# Patient Record
Sex: FEMALE | Race: WHITE
[De-identification: names, ages, dates, MRNs, and addresses within clinical notes are randomized per-mention and may not be internally consistent; named-entity substitution may affect disease eponyms.]

---

## 2017-12-15 ENCOUNTER — HOSPITAL ENCOUNTER (OUTPATIENT)
Dept: HOSPITAL 74 - JASU-SURG | Age: 47
Discharge: HOME | End: 2017-12-15
Attending: OBSTETRICS & GYNECOLOGY
Payer: COMMERCIAL

## 2017-12-15 VITALS — BODY MASS INDEX: 18.8 KG/M2

## 2017-12-15 VITALS — SYSTOLIC BLOOD PRESSURE: 103 MMHG | DIASTOLIC BLOOD PRESSURE: 53 MMHG | HEART RATE: 60 BPM

## 2017-12-15 VITALS — TEMPERATURE: 97.7 F

## 2017-12-15 DIAGNOSIS — N92.1: Primary | ICD-10-CM

## 2017-12-15 DIAGNOSIS — N84.0: ICD-10-CM

## 2017-12-15 PROCEDURE — 0UB98ZX EXCISION OF UTERUS, VIA NATURAL OR ARTIFICIAL OPENING ENDOSCOPIC, DIAGNOSTIC: ICD-10-PCS | Performed by: OBSTETRICS & GYNECOLOGY

## 2017-12-15 NOTE — OP
Operative Note





- Note:


Operative Date: 12/15/17


Pre-Operative Diagnosis: Menometrorrhagia


Operation: Hysteroscopy, D&C, polypectomy


Findings: 





Fundal endometrial polyp ~1cm


Post-Operative Diagnosis: Other (endometrial polyp)


Surgeon: Dany Dasilva


Anesthesiologist/CRNA: Dulce Maria Mendoza


Anesthesia: General


Specimens Removed: Endometrial curettings, endometrial polyp


Estimated Blood Loss (mls): 5


Drains, Volume Out (mls): 0


Blood Volume Replaced (mls): 0


Fluid Volume Replaced (mls): 400


Operative Report Dictated: Yes
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
## 2017-12-16 NOTE — OP
DATE OF OPERATION:  12/15/2017 

 

PREOPERATIVE DIAGNOSIS:  Menometrorrhagia.

 

POSTOPERATIVE DIAGNOSIS:  Menometrorrhagia and endometrial polyp.

 

SURGERY:  Hysteroscopy, dilation and curettage, polypectomy.

 

SURGEON:  Yasmine Wellington MD 

 

ANESTHESIOLOGIST:  Dulce Maria Mendoza MD

 

ANESTHESIA:  General.  

 

INTRAVENOUS FLUIDS:  Crystalloid 400 mL.

 

ESTIMATED BLOOD LOSS:  5 mL.  

 

COMPLICATIONS:  None.

 

PATHOLOGY:  Endometrial curettings and uterine polyp.  

 

INTRAOPERATIVE FINDINGS:  Examination under anesthesia revealed a small uterus with

no pelvic or adnexal masses.  Hysteroscopy revealed a small fundal endometrial polyp

approximately 1 cm in size.  There were no other lesions noted.  The polyp was

removed without complications.  

 

PROCEDURE:  The patient was met preoperatively.  Risks, benefits, and alternatives of

surgery were discussed in detail.  All questions were answered.  The patient was then

brought to the OR with the IV running.  She was placed on the surgical table in the

supine position.  The anesthesia was achieved without difficulty.  The patient was

then placed in a dorsal lithotomy position using adjustable Miah stirrups.  She was

examined under anesthesia with the findings as described above.  The patient was then

prepped and draped in the usual sterile fashion.  A timeout procedure was conducted

as per standard protocol.  The surgeons then proceeded with the operation.  A sterile

speculum was introduced inside the vagina, with good visualization of the cervix. 

The cervix was grasped with a single-tooth tenaculum.  No dilation of the cervical

canal was necessary.  A hysteroscope was gently introduced into the uterine cavity,

with the findings as described above.  The hysteroscope was used to remove the

endometrial polyp without complications.  The hysteroscope was then removed and sharp

uterine curettage was performed.  The tissue was sent to Pathology for evaluation.  A

hysteroscope was once again introduced into the uterine cavity, and a normal uterine

cavity was noted with good hemostasis.  All instruments were then removed from the

patient.  Sponge, lap, and instrument counts were correct.  Good hemostasis was

confirmed, and the patient was transferred to recovery room awake and in stable

condition.  

 

 

YASMINE WELLINGTON M.D.

 

[id number]

DD: 12/15/2017 14:32

DT: 12/16/2017 14:42

Job #:  [id number]

## 2017-12-18 NOTE — PATH
Surgical Pathology Report



Patient Name:  JAMIL SWEET

Accession #:  [id number]

[facility]. Rec. #:  [id number]                                                        

   /Age/Gender:  1970 (Age: 47) / F

Account:  [id number]                                                          

             Location: [facility]

Taken:  12/15/2017

Received:  12/15/2017

Reported:  2017

Physicians:  Dany Dasilva M.D.

  



Specimen(s) Received

 ENDOMETRIAL CURETTINGS AND POLYPS 





Clinical History

Vaginal bleeding







Final Diagnosis

ENDOMETRIAL CURETTINGS, POLYP, DILATATION AND CURETTAGE:

POLYPOID PROLIFERATIVE ENDOMETRIUM SUGGESTIVE OF ENDOMETRIAL POLYP, LOWER

UTERINE SEGMENT, SUPERFICIAL MYOMETRIUM AND BENIGN ENDOCERVICAL TISSUE. 





***Electronically Signed***

Antonella Witt M.D.





Gross Description

Received in formalin labeled "endometrial curettings and polyp," is a 4.0 x 2.2

x 0.4 cm aggregate of tan soft tissue fragments admixed with blood clot. The

formalin is filtered and the specimen is entirely submitted in 2 cassettes.



/12/15/2017



saudi/12/15/2017

## 2019-12-20 ENCOUNTER — HOSPITAL ENCOUNTER (OUTPATIENT)
Dept: HOSPITAL 74 - JASU-SURG | Age: 49
Discharge: HOME | End: 2019-12-20
Attending: OBSTETRICS & GYNECOLOGY
Payer: COMMERCIAL

## 2019-12-20 VITALS — BODY MASS INDEX: 18.3 KG/M2

## 2019-12-20 VITALS — TEMPERATURE: 98.1 F | DIASTOLIC BLOOD PRESSURE: 68 MMHG | SYSTOLIC BLOOD PRESSURE: 111 MMHG | HEART RATE: 64 BPM

## 2019-12-20 DIAGNOSIS — N92.1: Primary | ICD-10-CM

## 2019-12-20 PROCEDURE — 0U5B8ZZ DESTRUCTION OF ENDOMETRIUM, VIA NATURAL OR ARTIFICIAL OPENING ENDOSCOPIC: ICD-10-PCS | Performed by: OBSTETRICS & GYNECOLOGY

## 2019-12-20 NOTE — OP
Operative Note





- Note:


Operative Date: 12/20/19


Pre-Operative Diagnosis: Menometrorrhagia


Operation: Hysteroscopy, thermal endometrial ablation ([facility])


Findings: 





Active vaginal bleeding prior to procedure. Normal small endometrial cavity. 


Post-Operative Diagnosis: Same as Pre-op


Surgeon: Dany Dasilva


Anesthesiologist/CRNA: Tory Zaidi


Anesthesia: General


Estimated Blood Loss (mls): 30


Blood Volume Replaced (mls): 0


Fluid Volume Replaced (mls): 750


Operative Report Dictated: Yes
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
## 2019-12-27 NOTE — OP
DATE OF OPERATION:  12/20/2019

 

PREOPERATIVE DIAGNOSIS:  Menometrorrhagia.

 

POSTOPERATIVE DIAGNOSIS:  Menometrorrhagia.

 

PROCEDURE:  Hysteroscopy, thermal endometrial ablation via [employer] HTA.

 

SURGEON:  Dany Dasilva MD 

 

ANESTHESIOLOGIST:  Tory Zaidi MD 

 

ANESTHESIA:  General.

 

COMPLICATIONS:  None.

 

ESTIMATED BLOOD LOSS:  30 mL (patient was bleeding prior to surgery.  No

intraoperative blood loss due to procedure).

 

INTRAVENOUS FLUIDS:  750 mL.

 

PATHOLOGY:  None. 

 

FINDINGS:  Examination under anesthesia revealed a small, retroverted uterus. 

Hysteroscopy revealed a small uterine cavity with irregular endometrium due to

menstrual cycle and no lesions or masses noted.  Good hemostasis was noted at the end

of the procedure with no bleeding at the end of the procedure.  

 

PROCEDURE DESCRIPTION:  The patient was met preoperatively.  Risks, benefits,

alternatives of surgery were discussed in details.  All questions were answered.  The

consent form was reviewed and discussed.  The patient verbalized her understanding

and requested to proceed with surgery.  The patient was brought to the OR with the IV

running.  She was placed on the surgical table in a supine position.  The general

anesthesia was achieved without difficulty.  The patient was then placed in a dorsal

lithotomy position using adjustable Miah stirrups.  She was examined under

anesthesia with the findings as described above.  The patient was then prepped and

draped in the usual sterile fashion.  A time-out was conducted as per standard

protocol.  A weighted speculum was then introduced inside the vagina with good

visualization of the cervix.  The cervix was grasped with a single-tooth tenaculum. 

The endocervical canal was dilated using graduated Varela dilators.  A hysteroscopy

was then performed with findings as described above.  The uterus appeared within

normal limits.  The uterine cavity was noted to have no lesions or masses.  The

endometrium was consistent with vaginal bleeding and was shedding.  Endometrial

ablation was then performed using [employer] HTA under direct vision and without

complications.  There was no intraoperative fluid level due to hysteroscopy.  The

patient was noted to have good hemostasis, and no further vaginal bleeding was

observed at the end of the procedure.  Once the procedure was completed, the

hysteroscope was removed from the patient.  All of the instruments were removed from

the patient.  Sponge, lap, and instrument counts were correct.  The patient was

returned to supine position.  She was then transferred to recovery room in stable

condition and awake.  

 

 

SUZY [id number]

DD: 12/20/2019 17:01

DT: 12/21/2019 06:37

Job #:  [id number]